# Patient Record
Sex: FEMALE | Race: WHITE | NOT HISPANIC OR LATINO | Employment: UNEMPLOYED | URBAN - METROPOLITAN AREA
[De-identification: names, ages, dates, MRNs, and addresses within clinical notes are randomized per-mention and may not be internally consistent; named-entity substitution may affect disease eponyms.]

---

## 2022-04-07 ENCOUNTER — HISTORICAL (OUTPATIENT)
Dept: ADMINISTRATIVE | Facility: HOSPITAL | Age: 67
End: 2022-04-07
Payer: MEDICARE

## 2022-04-23 VITALS
SYSTOLIC BLOOD PRESSURE: 123 MMHG | WEIGHT: 168.19 LBS | BODY MASS INDEX: 23.55 KG/M2 | DIASTOLIC BLOOD PRESSURE: 77 MMHG | HEIGHT: 71 IN

## 2022-05-03 ENCOUNTER — TELEPHONE (OUTPATIENT)
Dept: NEUROLOGY | Facility: CLINIC | Age: 67
End: 2022-05-03
Payer: MEDICARE

## 2022-05-03 NOTE — TELEPHONE ENCOUNTER
Patient called reporting she had blood work done [facility] (Dr. King order labs) , states her levels are almost back to normal. Reports ASP levels for her liver went down to a 26. States since her levels went down it is causing her to have more seizures. Reports she had 2 on yesterday and 2 the other day. Requesting a call back to further discuss if she can increase Lamictal to 100 mg qAm and 100 mg qPM. Patient is currently taking 50 mg qAm and 100 mg qPM. Please advise.

## 2022-07-06 RX ORDER — TOLTERODINE TARTRATE 2 MG/1
2 TABLET, EXTENDED RELEASE ORAL 2 TIMES DAILY
COMMUNITY
Start: 2022-06-07 | End: 2023-02-15

## 2022-07-06 RX ORDER — METRONIDAZOLE 7.5 MG/G
1 CREAM TOPICAL DAILY
COMMUNITY
Start: 2022-02-19

## 2022-07-06 RX ORDER — THYROID, PORCINE 30 MG/1
1 TABLET ORAL
COMMUNITY
Start: 2022-01-24

## 2022-07-06 RX ORDER — LAMOTRIGINE 100 MG/1
1 TABLET, EXTENDED RELEASE ORAL 2 TIMES DAILY
COMMUNITY
Start: 2021-10-13 | End: 2022-12-07 | Stop reason: SDUPTHER

## 2022-07-06 RX ORDER — ESTRADIOL 0.5 MG/.5G
1 GEL TOPICAL DAILY
COMMUNITY
Start: 2021-11-04

## 2022-07-06 RX ORDER — NITROFURANTOIN (MACROCRYSTALS) 100 MG/1
100 CAPSULE ORAL
COMMUNITY
Start: 2021-11-04

## 2022-07-06 RX ORDER — TRAZODONE HYDROCHLORIDE 50 MG/1
50 TABLET ORAL NIGHTLY
COMMUNITY
Start: 2022-06-03

## 2022-07-06 RX ORDER — BUSPIRONE HYDROCHLORIDE 5 MG/1
5 TABLET ORAL 3 TIMES DAILY
COMMUNITY
Start: 2022-04-11

## 2022-07-06 RX ORDER — LAMOTRIGINE 100 MG/1
1 TABLET, EXTENDED RELEASE ORAL NIGHTLY
COMMUNITY
Start: 2022-05-21 | End: 2022-07-07

## 2022-07-06 RX ORDER — PANTOPRAZOLE SODIUM 40 MG/1
40 TABLET, DELAYED RELEASE ORAL DAILY
COMMUNITY
Start: 2022-06-07 | End: 2023-02-15

## 2022-07-06 RX ORDER — CELECOXIB 200 MG/1
200 CAPSULE ORAL DAILY
COMMUNITY
Start: 2022-06-18

## 2022-07-07 ENCOUNTER — OFFICE VISIT (OUTPATIENT)
Dept: NEUROLOGY | Facility: CLINIC | Age: 67
End: 2022-07-07
Payer: COMMERCIAL

## 2022-07-07 VITALS
BODY MASS INDEX: 23.63 KG/M2 | WEIGHT: 168.81 LBS | HEIGHT: 71 IN | DIASTOLIC BLOOD PRESSURE: 80 MMHG | HEART RATE: 80 BPM | SYSTOLIC BLOOD PRESSURE: 102 MMHG

## 2022-07-07 DIAGNOSIS — M54.17 LUMBOSACRAL RADICULOPATHY AT S1: ICD-10-CM

## 2022-07-07 DIAGNOSIS — G40.109 FOCAL EPILEPSY: Primary | ICD-10-CM

## 2022-07-07 PROCEDURE — 3079F PR MOST RECENT DIASTOLIC BLOOD PRESSURE 80-89 MM HG: ICD-10-PCS | Mod: CPTII,S$GLB,, | Performed by: PSYCHIATRY & NEUROLOGY

## 2022-07-07 PROCEDURE — 99999 PR PBB SHADOW E&M-EST. PATIENT-LVL IV: ICD-10-PCS | Mod: PBBFAC,,, | Performed by: PSYCHIATRY & NEUROLOGY

## 2022-07-07 PROCEDURE — 1159F PR MEDICATION LIST DOCUMENTED IN MEDICAL RECORD: ICD-10-PCS | Mod: CPTII,S$GLB,, | Performed by: PSYCHIATRY & NEUROLOGY

## 2022-07-07 PROCEDURE — 3008F PR BODY MASS INDEX (BMI) DOCUMENTED: ICD-10-PCS | Mod: CPTII,S$GLB,, | Performed by: PSYCHIATRY & NEUROLOGY

## 2022-07-07 PROCEDURE — 1159F MED LIST DOCD IN RCRD: CPT | Mod: CPTII,S$GLB,, | Performed by: PSYCHIATRY & NEUROLOGY

## 2022-07-07 PROCEDURE — 3074F PR MOST RECENT SYSTOLIC BLOOD PRESSURE < 130 MM HG: ICD-10-PCS | Mod: CPTII,S$GLB,, | Performed by: PSYCHIATRY & NEUROLOGY

## 2022-07-07 PROCEDURE — 1101F PR PT FALLS ASSESS DOC 0-1 FALLS W/OUT INJ PAST YR: ICD-10-PCS | Mod: CPTII,S$GLB,, | Performed by: PSYCHIATRY & NEUROLOGY

## 2022-07-07 PROCEDURE — 1160F RVW MEDS BY RX/DR IN RCRD: CPT | Mod: CPTII,S$GLB,, | Performed by: PSYCHIATRY & NEUROLOGY

## 2022-07-07 PROCEDURE — 99999 PR PBB SHADOW E&M-EST. PATIENT-LVL IV: CPT | Mod: PBBFAC,,, | Performed by: PSYCHIATRY & NEUROLOGY

## 2022-07-07 PROCEDURE — 99214 OFFICE O/P EST MOD 30 MIN: CPT | Mod: S$GLB,,, | Performed by: PSYCHIATRY & NEUROLOGY

## 2022-07-07 PROCEDURE — 1160F PR REVIEW ALL MEDS BY PRESCRIBER/CLIN PHARMACIST DOCUMENTED: ICD-10-PCS | Mod: CPTII,S$GLB,, | Performed by: PSYCHIATRY & NEUROLOGY

## 2022-07-07 PROCEDURE — 1101F PT FALLS ASSESS-DOCD LE1/YR: CPT | Mod: CPTII,S$GLB,, | Performed by: PSYCHIATRY & NEUROLOGY

## 2022-07-07 PROCEDURE — 3288F FALL RISK ASSESSMENT DOCD: CPT | Mod: CPTII,S$GLB,, | Performed by: PSYCHIATRY & NEUROLOGY

## 2022-07-07 PROCEDURE — 99214 PR OFFICE/OUTPT VISIT, EST, LEVL IV, 30-39 MIN: ICD-10-PCS | Mod: S$GLB,,, | Performed by: PSYCHIATRY & NEUROLOGY

## 2022-07-07 PROCEDURE — 3008F BODY MASS INDEX DOCD: CPT | Mod: CPTII,S$GLB,, | Performed by: PSYCHIATRY & NEUROLOGY

## 2022-07-07 PROCEDURE — 3079F DIAST BP 80-89 MM HG: CPT | Mod: CPTII,S$GLB,, | Performed by: PSYCHIATRY & NEUROLOGY

## 2022-07-07 PROCEDURE — 3074F SYST BP LT 130 MM HG: CPT | Mod: CPTII,S$GLB,, | Performed by: PSYCHIATRY & NEUROLOGY

## 2022-07-07 PROCEDURE — 3288F PR FALLS RISK ASSESSMENT DOCUMENTED: ICD-10-PCS | Mod: CPTII,S$GLB,, | Performed by: PSYCHIATRY & NEUROLOGY

## 2022-07-07 NOTE — PROGRESS NOTES
Chief Complaint   Patient presents with    Complex partial epilepsy     Pt states having frequent seizures since last visit 27 episodes in May, 12 episodes in June, and 6 episodes in July the most recent was July 5 lasting 11 seconds. Pt states seizures usually happen during the night mostly is shoulder pain and now has started to have episodes in the afternoon when napping not just at night     Liver fibrosis    Lumbar radiculopathy        This is a 66 y.o. female here for follow up for focal epilepsy. She has had fluctuating number of events per month may and June having 20+ shoulder spasms which are her focal seizures. No episodes of LOC. She is taking Lamictal 100 BID. Tolerates medication well. Liver function tests are normal. Has a liver ultrasound pending with Dr. King.     Medication List with Changes/Refills   Current Medications    ARMOUR THYROID 30 MG TAB    Take 1 tablet by mouth twice a week.    BUSPIRONE (BUSPAR) 5 MG TAB    Take 5 mg by mouth 3 (three) times daily.    CELECOXIB (CELEBREX) 200 MG CAPSULE    Take 200 mg by mouth once daily.    ESTRADIOL (DIVIGEL) 0.5 MG/0.5 GRAM (0.1 %) GLPK    Apply 1 application topically once daily at 6am.    LAMOTRIGINE XR (LAMICTAL XR) 100 MG TR24 XR TABLET    Take 1 tablet by mouth 2 (two) times a day.    METRONIDAZOLE 0.75% (METROCREAM) 0.75 % CREA    Apply 1 application topically once daily at 6am.    NITROFURANTOIN (MACRODANTIN) 100 MG CAPSULE    Take 100 mg by mouth every Mon, Wed, Fri.    PANTOPRAZOLE (PROTONIX) 40 MG TABLET    Take 40 mg by mouth once daily.    TOLTERODINE (DETROL) 2 MG TAB    Take 2 mg by mouth 2 (two) times daily.    TRAZODONE (DESYREL) 50 MG TABLET    Take 50 mg by mouth nightly.   Discontinued Medications    LAMOTRIGINE XR (LAMICTAL XR) 100 MG TR24 XR TABLET    Take 1 tablet by mouth nightly.        Vitals:    07/07/22 1322   BP: 102/80   Pulse: 80        NAD  Alert and oriented  Cognition and perception intact  No  aphasia  EOMI  No facial asymmetry  No dysarthria  Moves all extremities symmetrically  No gross coordination abnormalities  Gait normal    1. Focal epilepsy  Overview:  complex partial epilepsy who has been stable on Lamictal for several years. . Prior to me she saw Dr. Carpio. She started to notice jaundice in October 2020 and underwent a work-up including CMP which showed very elevated liver function tests in the thousands. She does report that she was taking Tylenol 6-8 times a day due to tendinitis around that time. Since that time she has stopped Tylenol, stop Celebrex and reduced Lamictal. She is on 100 ER BID, doing well. She recently underwent HIDA scan which did show liver fibrosis. Episodes are about the same, she had only 2 focal seizures in October. Also is tolerating Lamictal decrease well in terms of her anxiety.     simple partial seizures starting in college. She has 2 types of events. 1 which is characterized as right shoulder jerking or just shoulder tightening and spasm for a few seconds. She never has impairment of consciousness or LOC with these events. Her second type of even In 1975, 1983 she was trying to get pregnant for her son and she had shoulder jerking + LOC    EEGs have been unremarkable as well as imaging in the past.    She has been on trileptal (caused hyponatremia), keppra (not effective and made her angry).                Assessment & Plan:  Increase lamictal to 150 BID      2. Lumbosacral radiculopathy at S1  Overview:  EMG because of left-sided numbness which did show a left S1 radiculopathy. Patient had MRI showing synovial cyst on the right. Has seen Dr. Asher. He recommended just monitoring symptoms and for physical therapy        Assessment & Plan:  RFA planned

## 2022-07-11 DIAGNOSIS — R56.9 SEIZURE: Primary | ICD-10-CM

## 2022-07-11 RX ORDER — LAMOTRIGINE 50 MG/1
1 TABLET, EXTENDED RELEASE ORAL 2 TIMES DAILY
Qty: 60 TABLET | Refills: 5 | Status: SHIPPED | OUTPATIENT
Start: 2022-07-11 | End: 2022-12-05 | Stop reason: SDUPTHER

## 2022-12-05 DIAGNOSIS — R56.9 SEIZURE: ICD-10-CM

## 2022-12-05 RX ORDER — LAMOTRIGINE 50 MG/1
1 TABLET, EXTENDED RELEASE ORAL 2 TIMES DAILY
Qty: 60 TABLET | Refills: 5 | Status: SHIPPED | OUTPATIENT
Start: 2022-12-05 | End: 2022-12-07 | Stop reason: SDUPTHER

## 2022-12-07 DIAGNOSIS — R56.9 SEIZURE: ICD-10-CM

## 2022-12-07 RX ORDER — LAMOTRIGINE 100 MG/1
100 TABLET, EXTENDED RELEASE ORAL 2 TIMES DAILY
Qty: 180 TABLET | Refills: 3 | Status: SHIPPED | OUTPATIENT
Start: 2022-12-07 | End: 2023-08-24

## 2022-12-07 RX ORDER — LAMOTRIGINE 50 MG/1
1 TABLET, EXTENDED RELEASE ORAL 2 TIMES DAILY
Qty: 60 TABLET | Refills: 5 | Status: SHIPPED | OUTPATIENT
Start: 2022-12-07 | End: 2023-08-24 | Stop reason: SDUPTHER

## 2022-12-07 NOTE — TELEPHONE ENCOUNTER
Medication: Lamotrigine 100 mg TR24 XR     Pharmacy: [facility]    Last Appointment: 07/07/2022    Next Appointment: 02/15/2023

## 2022-12-07 NOTE — TELEPHONE ENCOUNTER
Medication: Lamotrigine 50 mg TR24    Pharmacy: [facility]    Last Appointment: 07/07/2022    Next Appointment: 02/15/2023

## 2023-02-15 ENCOUNTER — OFFICE VISIT (OUTPATIENT)
Dept: NEUROLOGY | Facility: CLINIC | Age: 68
End: 2023-02-15
Payer: MEDICARE

## 2023-02-15 VITALS
SYSTOLIC BLOOD PRESSURE: 136 MMHG | HEIGHT: 71 IN | WEIGHT: 171 LBS | DIASTOLIC BLOOD PRESSURE: 80 MMHG | BODY MASS INDEX: 23.94 KG/M2 | HEART RATE: 90 BPM

## 2023-02-15 DIAGNOSIS — G40.109 FOCAL EPILEPSY: Primary | ICD-10-CM

## 2023-02-15 PROCEDURE — 99214 OFFICE O/P EST MOD 30 MIN: CPT | Mod: PBBFAC | Performed by: PSYCHIATRY & NEUROLOGY

## 2023-02-15 PROCEDURE — 99213 PR OFFICE/OUTPT VISIT, EST, LEVL III, 20-29 MIN: ICD-10-PCS | Mod: S$PBB,,, | Performed by: PSYCHIATRY & NEUROLOGY

## 2023-02-15 PROCEDURE — 99999 PR PBB SHADOW E&M-EST. PATIENT-LVL IV: ICD-10-PCS | Mod: PBBFAC,,, | Performed by: PSYCHIATRY & NEUROLOGY

## 2023-02-15 PROCEDURE — 99999 PR PBB SHADOW E&M-EST. PATIENT-LVL IV: CPT | Mod: PBBFAC,,, | Performed by: PSYCHIATRY & NEUROLOGY

## 2023-02-15 PROCEDURE — 99213 OFFICE O/P EST LOW 20 MIN: CPT | Mod: S$PBB,,, | Performed by: PSYCHIATRY & NEUROLOGY

## 2023-02-15 RX ORDER — ESTRADIOL 0.1 MG/G
CREAM VAGINAL
COMMUNITY
Start: 2022-12-08

## 2023-02-15 RX ORDER — FINASTERIDE 1 MG/1
1 TABLET, FILM COATED ORAL DAILY
COMMUNITY
Start: 2022-11-29

## 2023-02-15 RX ORDER — SULFAMETHOXAZOLE AND TRIMETHOPRIM 800; 160 MG/1; MG/1
60 TABLET ORAL
COMMUNITY
Start: 2022-09-28

## 2023-02-15 RX ORDER — PROGESTERONE 100 MG/1
100 CAPSULE ORAL NIGHTLY
COMMUNITY
Start: 2022-12-27

## 2023-02-15 NOTE — PROGRESS NOTES
Chief Complaint   Patient presents with    Seizures     Pt states has had 22 seizures since last visit last seizure was 02/14/2023 lasted six seconds  was present denies loss of consciousness denies missed medication current dose Lamotrigine 150 mg BID         This is a 67 y.o. female here for follow up for here for follow up for focal epilepsy. She has had fluctuating number of events per month, December and January only 2-3 events, February so far has had 5 events.  On Lamictal 150 twice daily, no episodes of loss of function, liver function tests have been normal.      Medication List with Changes/Refills   Current Medications    ARMOUR THYROID 30 MG TAB    Take 1 tablet by mouth twice a week.    BUSPIRONE (BUSPAR) 5 MG TAB    Take 5 mg by mouth 3 (three) times daily.    CELECOXIB (CELEBREX) 200 MG CAPSULE    Take 200 mg by mouth once daily.    ESTRADIOL (DIVIGEL) 0.5 MG/0.5 GRAM (0.1 %) GLPK    Apply 1 application topically once daily at 6am.    LAMOTRIGINE (LAMICTAL XR) 50 MG TR24    Take 1 tablet by mouth 2 (two) times a day.    LAMOTRIGINE 50 MG TR24    TAKE 1 TABLET BY MOUTH TWICE A DAY    LAMOTRIGINE XR (LAMICTAL XR) 100 MG TR24 XR TABLET    Take 1 tablet (100 mg total) by mouth 2 (two) times a day.    METRONIDAZOLE 0.75% (METROCREAM) 0.75 % CREA    Apply 1 application topically once daily at 6am.    NITROFURANTOIN (MACRODANTIN) 100 MG CAPSULE    Take 100 mg by mouth every Mon, Wed, Fri.    PANTOPRAZOLE (PROTONIX) 40 MG TABLET    Take 40 mg by mouth once daily.    TOLTERODINE (DETROL) 2 MG TAB    Take 2 mg by mouth 2 (two) times daily.    TRAZODONE (DESYREL) 50 MG TABLET    Take 50 mg by mouth nightly.        Vitals:    02/15/23 1315   BP: 136/80   Pulse: 90        NAD  Alert and oriented  Cognition and perception intact  No aphasia  EOMI  No facial asymmetry  No dysarthria  Moves all extremities symmetrically  No gross coordination abnormalities  Gait normal     1. Focal epilepsy  Overview:  complex  partial epilepsy who has been stable on Lamictal for several years. . Prior to me she saw Dr. Carpio. She started to notice jaundice in October 2020 and underwent a work-up including CMP which showed very elevated liver function tests in the thousands. She does report that she was taking Tylenol 6-8 times a day due to tendinitis around that time. Since that time she has stopped Tylenol, stop Celebrex and reduced Lamictal. She is on 100 ER BID, doing well. She recently underwent HIDA scan which did show liver fibrosis. Episodes are about the same, she had only 2 focal seizures in October. Also is tolerating Lamictal decrease well in terms of her anxiety.     simple partial seizures starting in college. She has 2 types of events. 1 which is characterized as right shoulder jerking or just shoulder tightening and spasm for a few seconds. She never has impairment of consciousness or LOC with these events. Her second type of even In 1975, 1983 she was trying to get pregnant for her son and she had shoulder jerking + LOC    EEGs have been unremarkable as well as imaging in the past.    She has been on trileptal (caused hyponatremia), keppra (not effective and made her angry).                   LFTs planned for a few months from now, cont Lamictal 150 BID, did not want to increase further as she still takes tylenol 4 times a day

## 2023-04-25 ENCOUNTER — TELEPHONE (OUTPATIENT)
Dept: NEUROLOGY | Facility: CLINIC | Age: 68
End: 2023-04-25

## 2023-08-24 ENCOUNTER — OFFICE VISIT (OUTPATIENT)
Dept: NEUROLOGY | Facility: CLINIC | Age: 68
End: 2023-08-24
Payer: MEDICARE

## 2023-08-24 VITALS
WEIGHT: 176.19 LBS | BODY MASS INDEX: 24.67 KG/M2 | SYSTOLIC BLOOD PRESSURE: 132 MMHG | DIASTOLIC BLOOD PRESSURE: 93 MMHG | HEIGHT: 71 IN

## 2023-08-24 DIAGNOSIS — G40.109 FOCAL EPILEPSY: Primary | ICD-10-CM

## 2023-08-24 DIAGNOSIS — R94.5 ABNORMAL RESULTS OF LIVER FUNCTION STUDIES: ICD-10-CM

## 2023-08-24 DIAGNOSIS — R56.9 SEIZURE: ICD-10-CM

## 2023-08-24 PROCEDURE — 99999 PR PBB SHADOW E&M-EST. PATIENT-LVL III: ICD-10-PCS | Mod: PBBFAC,,, | Performed by: PSYCHIATRY & NEUROLOGY

## 2023-08-24 PROCEDURE — 99999 PR PBB SHADOW E&M-EST. PATIENT-LVL III: CPT | Mod: PBBFAC,,, | Performed by: PSYCHIATRY & NEUROLOGY

## 2023-08-24 PROCEDURE — 99214 PR OFFICE/OUTPT VISIT, EST, LEVL IV, 30-39 MIN: ICD-10-PCS | Mod: S$PBB,,, | Performed by: PSYCHIATRY & NEUROLOGY

## 2023-08-24 PROCEDURE — 99214 OFFICE O/P EST MOD 30 MIN: CPT | Mod: S$PBB,,, | Performed by: PSYCHIATRY & NEUROLOGY

## 2023-08-24 PROCEDURE — 99213 OFFICE O/P EST LOW 20 MIN: CPT | Mod: PBBFAC | Performed by: PSYCHIATRY & NEUROLOGY

## 2023-08-24 RX ORDER — LAMOTRIGINE 50 MG/1
TABLET, EXTENDED RELEASE ORAL
Qty: 630 TABLET | Refills: 3 | Status: SHIPPED | OUTPATIENT
Start: 2023-08-24

## 2023-08-24 RX ORDER — METHOCARBAMOL 750 MG/1
750-1500 TABLET, FILM COATED ORAL DAILY
COMMUNITY
Start: 2023-08-01

## 2023-08-24 NOTE — PROGRESS NOTES
Chief Complaint   Patient presents with    Follow-up     6 month follow up seizures  Patient had several seizures since last appointment in January 02 /2023-5 seizures   March -13 April -3  May -5 June -10  July 2  August-6        This is a 67 y.o. female here for follow up for focal epilepsy. She has had fluctuating number of events per month--  2 /2023-5 seizures   March -13 April -3  May -5 June -10  July 2  August-6\    Liver ultrasound showed improvement in previously noted fibrosis when patient had liver failure.  She still notices some elevated anxiety on the lower dose of Lamictal.    Medication List with Changes/Refills   Current Medications    ARMOUR THYROID 30 MG TAB    Take 1 tablet by mouth twice a week.    ARMOUR THYROID 60 MG TAB    Take 60 mg by mouth. To be taken five times a week    BUSPIRONE (BUSPAR) 5 MG TAB    Take 5 mg by mouth 3 (three) times daily.    CELECOXIB (CELEBREX) 200 MG CAPSULE    Take 200 mg by mouth once daily.    ESTRADIOL (DIVIGEL) 0.5 MG/0.5 GRAM (0.1 %) GLPK    Apply 1 application topically once daily at 6am.    ESTRADIOL (ESTRACE) 0.01 % (0.1 MG/GRAM) VAGINAL CREAM    SMARTSIG:Gram(s) Vaginal 3 Times a Week    FINASTERIDE (PROPECIA) 1 MG TABLET    Take 1 mg by mouth once daily.    LAMOTRIGINE (LAMICTAL XR) 50 MG TR24    Take 1 tablet by mouth 2 (two) times a day.    LAMOTRIGINE XR (LAMICTAL XR) 100 MG TR24 XR TABLET    Take 1 tablet (100 mg total) by mouth 2 (two) times a day.    METHOCARBAMOL (ROBAXIN) 750 MG TAB    Take 750-1,500 mg by mouth once daily.    METRONIDAZOLE 0.75% (METROCREAM) 0.75 % CREA    Apply 1 application topically once daily at 6am.    NITROFURANTOIN (MACRODANTIN) 100 MG CAPSULE    Take 100 mg by mouth every Mon, Wed, Fri.    PROGESTERONE (PROMETRIUM) 100 MG CAPSULE    Take 100 mg by mouth every evening.    TRAZODONE (DESYREL) 50 MG TABLET    Take 50 mg by mouth nightly.        Vitals:    08/24/23 1423   BP: (!) 132/93        NAD  Alert and  oriented  Cognition and perception intact  No aphasia  EOMI  No facial asymmetry  No dysarthria  Moves all extremities symmetrically  No gross coordination abnormalities  Gait normal       1. Focal epilepsy  Overview:  complex partial epilepsy who has been stable on Lamictal for several years. . Prior to me she saw Dr. Carpio. She started to notice jaundice in October 2020 and underwent a work-up including CMP which showed very elevated liver function tests in the thousands. She does report that she was taking Tylenol 6-8 times a day due to tendinitis around that time. Since that time she has stopped Tylenol, stop Celebrex and reduced Lamictal. She is on 100 ER BID, doing well. She recently underwent HIDA scan which did show liver fibrosis. Episodes are about the same, she had only 2 focal seizures in October. Also is tolerating Lamictal decrease well in terms of her anxiety.     simple partial seizures starting in college. She has 2 types of events. 1 which is characterized as right shoulder jerking or just shoulder tightening and spasm for a few seconds. She never has impairment of consciousness or LOC with these events. Her second type of even In 1975, 1983 she was trying to get pregnant for her son and she had shoulder jerking + LOC    EEGs have been unremarkable as well as imaging in the past.    She has been on trileptal (caused hyponatremia), keppra (not effective and made her angry).                Orders:  -     Comprehensive Metabolic Panel; Future; Expected date: 10/18/2023  -     Protime-INR; Future; Expected date: 08/24/2023    2. Abnormal results of liver function studies  -     Protime-INR; Future; Expected date: 08/24/2023    3. Seizure  -     lamoTRIgine (LAMICTAL XR) 50 mg TR24; 4 tabs in AM, 3 tabs in PM  Dispense: 630 tablet; Refill: 3        Increase Lamictal to 200/150

## 2023-09-18 ENCOUNTER — TELEPHONE (OUTPATIENT)
Dept: NEUROLOGY | Facility: CLINIC | Age: 68
End: 2023-09-18
Payer: MEDICARE

## 2023-09-18 DIAGNOSIS — G40.109 FOCAL EPILEPSY: Primary | ICD-10-CM

## 2023-09-18 RX ORDER — LAMOTRIGINE 50 MG/1
TABLET, EXTENDED RELEASE ORAL
Qty: 90 TABLET | Refills: 3 | Status: SHIPPED | OUTPATIENT
Start: 2023-09-18

## 2023-09-18 RX ORDER — LAMOTRIGINE 100 MG/1
TABLET, EXTENDED RELEASE ORAL
Qty: 270 TABLET | Refills: 3 | Status: SHIPPED | OUTPATIENT
Start: 2023-09-18

## 2023-09-18 NOTE — TELEPHONE ENCOUNTER
Patient called reporting her Rx for Lamotrigine ER 50 mg would of cost her more out of pocket for a 90 day Rx. States she takes 200 mg in qAm and 150 mg qPM. Requesting if Dr. Rene can cancel the old Rx and send a new one. States she needs a Rx for 100 mg tablets and 50 mg tablets for 90 day scripts. States by Dr. Rene doing it this way it will save her $200.00 difference from the original Rx. Patient states she didn't  the Rx at the pharmacy she has right now and refused it. Please advise.

## 2024-09-17 DIAGNOSIS — G40.109 FOCAL EPILEPSY: ICD-10-CM

## 2024-09-17 RX ORDER — LAMOTRIGINE 100 MG/1
TABLET, EXTENDED RELEASE ORAL
Qty: 270 TABLET | Refills: 3 | Status: SHIPPED | OUTPATIENT
Start: 2024-09-17

## 2024-10-02 DIAGNOSIS — G40.109 FOCAL EPILEPSY: ICD-10-CM

## 2024-10-02 RX ORDER — LAMOTRIGINE 50 MG/1
TABLET, EXTENDED RELEASE ORAL
Qty: 90 TABLET | Refills: 3 | Status: SHIPPED | OUTPATIENT
Start: 2024-10-02

## 2024-11-04 ENCOUNTER — OFFICE VISIT (OUTPATIENT)
Dept: NEUROLOGY | Facility: CLINIC | Age: 69
End: 2024-11-04
Payer: MEDICARE

## 2024-11-04 VITALS
HEIGHT: 71 IN | DIASTOLIC BLOOD PRESSURE: 88 MMHG | SYSTOLIC BLOOD PRESSURE: 121 MMHG | WEIGHT: 165 LBS | HEART RATE: 81 BPM | BODY MASS INDEX: 23.1 KG/M2

## 2024-11-04 DIAGNOSIS — R56.9 SEIZURE: ICD-10-CM

## 2024-11-04 DIAGNOSIS — G40.109 FOCAL EPILEPSY: Primary | ICD-10-CM

## 2024-11-04 PROCEDURE — 99213 OFFICE O/P EST LOW 20 MIN: CPT | Mod: PBBFAC | Performed by: PSYCHIATRY & NEUROLOGY

## 2024-11-04 PROCEDURE — 99999 PR PBB SHADOW E&M-EST. PATIENT-LVL III: CPT | Mod: PBBFAC,,, | Performed by: PSYCHIATRY & NEUROLOGY

## 2024-11-04 RX ORDER — LAMOTRIGINE 50 MG/1
1 TABLET, EXTENDED RELEASE ORAL NIGHTLY
COMMUNITY
End: 2024-11-04

## 2024-11-04 RX ORDER — BUSPIRONE HYDROCHLORIDE 10 MG/1
10 TABLET ORAL 2 TIMES DAILY
COMMUNITY

## 2024-11-04 RX ORDER — LAMOTRIGINE 200 MG/1
200 TABLET, EXTENDED RELEASE ORAL 2 TIMES DAILY
Qty: 60 TABLET | Refills: 5 | Status: SHIPPED | OUTPATIENT
Start: 2024-11-04

## 2024-11-04 RX ORDER — CYCLOSPORINE OPHTHALMIC SOLUTION 1 MG/ML
1 SOLUTION/ DROPS OPHTHALMIC 2 TIMES DAILY
COMMUNITY

## 2024-11-04 NOTE — PROGRESS NOTES
Chief Complaint   Patient presents with    Focal epilepsy     Last seizure was this morning. In the middle of night right shoulder tensed up lasting 3 seconds. Since last office visit had 48 seizures. Tolerating Lamotrigine  mg in AM and 150 mg in PM.        This is a 69 y.o. female here for follow up for seizure. Last seizure was this morning. In the middle of night right shoulder tensed up lasting 3 seconds. Since last office visit had 48 seizures. Tolerating Lamotrigine  mg in AM and 150 mg in PM. Labs labs in May were normal.     Medication List with Changes/Refills   Current Medications    ARMOUR THYROID 30 MG TAB    Take 1 tablet by mouth twice a week. 5 times a week    ARMOUR THYROID 60 MG TAB    Take 60 mg by mouth. Twice weekly    BUSPIRONE (BUSPAR) 10 MG TABLET    Take 10 mg by mouth 2 (two) times daily.    BUSPIRONE (BUSPAR) 5 MG TAB    Take 5 mg by mouth 3 (three) times daily.    CELECOXIB (CELEBREX) 200 MG CAPSULE    Take 200 mg by mouth once daily.    CYCLOSPORINE (VEVYE) 0.1 % DROP    Apply 1 drop to eye 2 (two) times a day.    ESTRADIOL (DIVIGEL) 0.5 MG/0.5 GRAM (0.1 %) GLPK    Apply 1 application topically once daily at 6am.    ESTRADIOL (ESTRACE) 0.01 % (0.1 MG/GRAM) VAGINAL CREAM    SMARTSIG:Gram(s) Vaginal 3 Times a Week    FINASTERIDE (PROPECIA) 1 MG TABLET    Take 1 mg by mouth once daily.    METHOCARBAMOL (ROBAXIN) 750 MG TAB    Take 750-1,500 mg by mouth once daily.    METRONIDAZOLE 0.75% (METROCREAM) 0.75 % CREA    Apply 1 application topically once daily at 6am.    NITROFURANTOIN (MACRODANTIN) 100 MG CAPSULE    Take 100 mg by mouth every Mon, Wed, Fri.    PROGESTERONE (PROMETRIUM) 100 MG CAPSULE    Take 100 mg by mouth every evening.    TRAZODONE (DESYREL) 50 MG TABLET    Take 50 mg by mouth nightly.   Changed and/or Refilled Medications    Modified Medication Previous Medication    LAMOTRIGINE XR (LAMICTAL XR) 200 MG TR24 XR TABLET lamotrigine XR (LAMICTAL XR) 100 mg TR24 XR  tablet       Take 1 tablet (200 mg total) by mouth 2 (two) times a day.    2 TABLETS IN THE MORNING 1 TABLET IN THE EVENING   Discontinued Medications    LAMOTRIGINE (LAMICTAL XR) 50 MG TR24    4 tabs in AM, 3 tabs in PM    LAMOTRIGINE 50 MG TR24    TAKE 1 TABLET BY MOUTH EVERY EVENING    LAMOTRIGINE 50 MG TR24    Take 1 tablet by mouth every evening.        Vitals:    11/04/24 0916   BP: 121/88   Pulse: 81        NAD  Alert and oriented  Cognition and perception intact  No aphasia  EOMI  No facial asymmetry  No dysarthria  Moves all extremities symmetrically  No gross coordination abnormalities  Gait normal     1. Focal epilepsy  Overview:  complex partial epilepsy who has been stable on Lamictal for several years. . Prior to me she saw Dr. Carpio. She started to notice jaundice in October 2020 and underwent a work-up including CMP which showed very elevated liver function tests in the thousands. She does report that she was taking Tylenol 6-8 times a day due to tendinitis around that time. Since that time she has stopped Tylenol, stop Celebrex and reduced Lamictal. She is on 100 ER BID, doing well. She recently underwent HIDA scan which did show liver fibrosis. Episodes are about the same, she had only 2 focal seizures in October. Also is tolerating Lamictal decrease well in terms of her anxiety.     simple partial seizures starting in college. She has 2 types of events. 1 which is characterized as right shoulder jerking or just shoulder tightening and spasm for a few seconds. She never has impairment of consciousness or LOC with these events. Her second type of even In 1975, 1983 she was trying to get pregnant for her son and she had shoulder jerking + LOC    EEGs have been unremarkable as well as imaging in the past.    She has been on trileptal (caused hyponatremia), keppra (not effective and made her angry).                Orders:  -     lamotrigine XR (LAMICTAL XR) 200 mg TR24 XR tablet; Take 1 tablet (200 mg  total) by mouth 2 (two) times a day.  Dispense: 60 tablet; Refill: 5    2. Seizure  -     CBC Auto Differential; Future; Expected date: 11/04/2024  -     Comprehensive Metabolic Panel; Future     Increase lamictal to 200 XR  BID

## 2025-04-30 DIAGNOSIS — G40.109 FOCAL EPILEPSY: ICD-10-CM

## 2025-04-30 RX ORDER — LAMOTRIGINE 200 MG/1
TABLET, EXTENDED RELEASE ORAL
Qty: 60 TABLET | Refills: 5 | Status: SHIPPED | OUTPATIENT
Start: 2025-04-30

## 2025-06-10 ENCOUNTER — OFFICE VISIT (OUTPATIENT)
Dept: NEUROLOGY | Facility: CLINIC | Age: 70
End: 2025-06-10
Payer: MEDICARE

## 2025-06-10 VITALS
SYSTOLIC BLOOD PRESSURE: 125 MMHG | DIASTOLIC BLOOD PRESSURE: 76 MMHG | HEIGHT: 71 IN | BODY MASS INDEX: 23.66 KG/M2 | HEART RATE: 79 BPM | WEIGHT: 169 LBS

## 2025-06-10 DIAGNOSIS — G40.109 FOCAL EPILEPSY: ICD-10-CM

## 2025-06-10 DIAGNOSIS — G40.109 FOCAL EPILEPSY: Primary | ICD-10-CM

## 2025-06-10 PROCEDURE — 99213 OFFICE O/P EST LOW 20 MIN: CPT | Mod: S$PBB,,, | Performed by: PSYCHIATRY & NEUROLOGY

## 2025-06-10 PROCEDURE — 99213 OFFICE O/P EST LOW 20 MIN: CPT | Mod: PBBFAC | Performed by: PSYCHIATRY & NEUROLOGY

## 2025-06-10 PROCEDURE — 99999 PR PBB SHADOW E&M-EST. PATIENT-LVL III: CPT | Mod: PBBFAC,,, | Performed by: PSYCHIATRY & NEUROLOGY

## 2025-06-10 RX ORDER — LAMOTRIGINE 200 MG/1
200 TABLET, EXTENDED RELEASE ORAL 2 TIMES DAILY
Qty: 180 TABLET | Refills: 3 | Status: SHIPPED | OUTPATIENT
Start: 2025-06-10

## 2025-06-10 RX ORDER — LOTILANER OPHTHALMIC SOLUTION 2.5 MG/ML
1 SOLUTION/ DROPS OPHTHALMIC 2 TIMES DAILY
COMMUNITY
Start: 2025-05-05

## 2025-06-10 NOTE — PROGRESS NOTES
Chief Complaint   Patient presents with    Focal epilepsy     Since last office visit had 22 focal seizures. Last seizure was on May 28, 2025, lasting for 7 secs. Gets intense pain to right shoulder. States seizures normally when she is almost asleep.         This is a 69 y.o. female here for follow up for focal epilepsy.  She is doing well. She reports  28 seizures since last visit.  Recall her seizures are shoulders tensing up for a few sec. She is tolerating lamotrigine  mg twice daily.  Last labs 1124 were normal    Medication List with Changes/Refills   Current Medications    ARMOUR THYROID 30 MG TAB    Take 1 tablet by mouth twice a week. 5 times a week    ARMOUR THYROID 60 MG TAB    Take 60 mg by mouth. Twice weekly    BUSPIRONE (BUSPAR) 10 MG TABLET    Take 10 mg by mouth 2 (two) times daily.    BUSPIRONE (BUSPAR) 5 MG TAB    Take 5 mg by mouth 3 (three) times daily.    CELECOXIB (CELEBREX) 200 MG CAPSULE    Take 200 mg by mouth once daily.    CYCLOSPORINE (VEVYE) 0.1 % DROP    Apply 1 drop to eye 2 (two) times a day.    ESTRADIOL (DIVIGEL) 0.5 MG/0.5 GRAM (0.1 %) GLPK    Apply 1 application topically once daily at 6am.    ESTRADIOL (ESTRACE) 0.01 % (0.1 MG/GRAM) VAGINAL CREAM    SMARTSIG:Gram(s) Vaginal 3 Times a Week    FINASTERIDE (PROPECIA) 1 MG TABLET    Take 1 mg by mouth once daily.    LAMOTRIGINE XR (LAMICTAL XR) 200 MG TR24 XR TABLET    TAKE 1 TABLET (200 MG TOTAL) BY MOUTH 2 TIMES A DAY.    METHOCARBAMOL (ROBAXIN) 750 MG TAB    Take 750-1,500 mg by mouth once daily.    METRONIDAZOLE 0.75% (METROCREAM) 0.75 % CREA    Apply 1 application topically once daily at 6am.    NITROFURANTOIN (MACRODANTIN) 100 MG CAPSULE    Take 100 mg by mouth every Mon, Wed, Fri.    PROGESTERONE (PROMETRIUM) 100 MG CAPSULE    Take 100 mg by mouth every evening.    TRAZODONE (DESYREL) 50 MG TABLET    Take 50 mg by mouth nightly.    XDEMVY 0.25 % DROP    Place 1 drop into both eyes 2 (two) times a day.        Vitals:     06/10/25 1430   BP: 125/76   Pulse: 79        NAD  Alert and oriented  Cognition and perception intact  No aphasia  EOMI  No facial asymmetry  No dysarthria  Moves all extremities symmetrically  No gross coordination abnormalities  Gait normal     1. Focal epilepsy  Overview:  complex partial epilepsy who has been stable on Lamictal for several years. . Prior to me she saw Dr. Carpio. She started to notice jaundice in October 2020 and underwent a work-up including CMP which showed very elevated liver function tests in the thousands. She does report that she was taking Tylenol 6-8 times a day due to tendinitis around that time. Since that time she has stopped Tylenol, stop Celebrex and reduced Lamictal. She is on 100 ER BID, doing well. She recently underwent HIDA scan which did show liver fibrosis. Episodes are about the same, she had only 2 focal seizures in October. Also is tolerating Lamictal decrease well in terms of her anxiety.     simple partial seizures starting in college. She has 2 types of events. 1 which is characterized as right shoulder jerking or just shoulder tightening and spasm for a few seconds. She never has impairment of consciousness or LOC with these events. Her second type of even In 1975, 1983 she was trying to get pregnant for her son and she had shoulder jerking + LOC    EEGs have been unremarkable as well as imaging in the past.    She has been on trileptal (caused hyponatremia), keppra (not effective and made her angry).                   Cont Lamictal  BID